# Patient Record
Sex: MALE | Race: WHITE | HISPANIC OR LATINO | Employment: FULL TIME | URBAN - METROPOLITAN AREA
[De-identification: names, ages, dates, MRNs, and addresses within clinical notes are randomized per-mention and may not be internally consistent; named-entity substitution may affect disease eponyms.]

---

## 2021-08-11 ENCOUNTER — OFFICE VISIT (OUTPATIENT)
Dept: URGENT CARE | Age: 26
End: 2021-08-11
Payer: COMMERCIAL

## 2021-08-11 VITALS
HEART RATE: 80 BPM | SYSTOLIC BLOOD PRESSURE: 122 MMHG | DIASTOLIC BLOOD PRESSURE: 74 MMHG | TEMPERATURE: 98 F | WEIGHT: 245.8 LBS | HEIGHT: 71 IN | BODY MASS INDEX: 34.41 KG/M2 | RESPIRATION RATE: 18 BRPM | OXYGEN SATURATION: 100 %

## 2021-08-11 DIAGNOSIS — Z02.4 DRIVER'S PERMIT PE (PHYSICAL EXAMINATION): Primary | ICD-10-CM

## 2021-08-11 NOTE — PROGRESS NOTES
[address]        NAME: Carol Giron is a 32 y o  male  : 1995    MRN: [id number]  DATE: 2021  TIME: 5:08 PM    Assessment and Plan   's permit PE (physical examination) [Z02 4]  1  's permit PE (physical examination)           Patient Instructions       Follow up with PCP in 3-5 days  Proceed to  ER if symptoms worsen  Chief Complaint     Chief Complaint   Patient presents with    Annual Exam     pt is here for drivers license exam          History of Present Illness       Annual Exam (pt is here for drivers license exam )    See scanned 's permit form      Review of Systems   Review of Systems   Constitutional: Negative  HENT: Negative  Eyes: Negative  Respiratory: Negative  Cardiovascular: Negative  Gastrointestinal: Negative  Musculoskeletal: Negative  Current Medications     No current outpatient medications on file  Current Allergies     Allergies as of 2021    (No Known Allergies)            The following portions of the patient's history were reviewed and updated as appropriate: allergies, current medications, past family history, past medical history, past social history, past surgical history and problem list      History reviewed  No pertinent past medical history  History reviewed  No pertinent surgical history  No family history on file  Medications have been verified  Objective   /74   Pulse 80   Temp 98 °F (36 7 °C)   Resp 18   Ht 5' 11" (1 803 m)   Wt 111 kg (245 lb 12 8 oz)   SpO2 100%   BMI 34 28 kg/m²   No LMP for male patient  Physical Exam     Physical Exam  Constitutional:       Appearance: He is well-developed  HENT:      Right Ear: External ear normal       Left Ear: External ear normal       Nose: Nose normal       Mouth/Throat:      Pharynx: No oropharyngeal exudate     Eyes:      Conjunctiva/sclera: Conjunctivae normal    Cardiovascular:      Rate and Rhythm: Normal rate and regular rhythm  Heart sounds: Normal heart sounds  No murmur heard  Pulmonary:      Effort: Pulmonary effort is normal  No respiratory distress  Breath sounds: Normal breath sounds  No wheezing or rales  Chest:      Chest wall: No tenderness  Abdominal:      General: There is no distension  Palpations: Abdomen is soft  There is no mass  Tenderness: There is no abdominal tenderness  There is no guarding or rebound  Musculoskeletal:         General: Normal range of motion  Cervical back: Normal range of motion and neck supple  Lymphadenopathy:      Cervical: No cervical adenopathy  Skin:     General: Skin is warm  Findings: No erythema or rash  Neurological:      Mental Status: He is alert and oriented to person, place, and time  Cranial Nerves: No cranial nerve deficit

## 2022-06-26 ENCOUNTER — HOSPITAL ENCOUNTER (EMERGENCY)
Facility: HOSPITAL | Age: 27
Discharge: HOME | End: 2022-06-26
Attending: EMERGENCY MEDICINE | Admitting: EMERGENCY MEDICINE

## 2022-06-26 VITALS
DIASTOLIC BLOOD PRESSURE: 68 MMHG | SYSTOLIC BLOOD PRESSURE: 153 MMHG | RESPIRATION RATE: 20 BRPM | HEART RATE: 77 BPM | OXYGEN SATURATION: 100 % | TEMPERATURE: 98.5 F

## 2022-06-26 DIAGNOSIS — J01.00 ACUTE NON-RECURRENT MAXILLARY SINUSITIS: Primary | ICD-10-CM

## 2022-06-26 PROCEDURE — 99284 EMERGENCY DEPT VISIT MOD MDM: CPT | Performed by: PHYSICIAN ASSISTANT

## 2022-06-26 PROCEDURE — 99282 EMERGENCY DEPT VISIT SF MDM: CPT

## 2022-06-26 RX ORDER — AMOXICILLIN AND CLAVULANATE POTASSIUM 875; 125 MG/1; MG/1
1 TABLET, FILM COATED ORAL EVERY 12 HOURS SCHEDULED
Qty: 20 TABLET | Refills: 0 | Status: SHIPPED | OUTPATIENT
Start: 2022-06-26 | End: 2022-07-06

## 2022-06-26 RX ORDER — PREDNISONE 20 MG/1
TABLET ORAL
Qty: 20 TABLET | Refills: 0 | Status: SHIPPED | OUTPATIENT
Start: 2022-06-26

## 2022-06-26 NOTE — ED PROVIDER NOTES
History  Chief Complaint   Patient presents with    Nasal Congestion     Pt c/o nasal congestion for a week  States dark green/ yellow color  Now having pain in right ear  Sinus Problem  Pain details:     Location:  Maxillary    Quality:  Pressure    Severity:  Moderate    Duration:  2 weeks    Timing:  Constant  Progression:  Worsening  Chronicity:  New  Context: recent URI    Relieved by:  Nothing  Worsened by:  Nothing  Ineffective treatments:  Acetaminophen, inhaler use, saline sprays, warm compresses and oral decongestants  Associated symptoms: congestion, ear pain, headaches, mouth breathing, nausea, rhinorrhea and sneezing    Associated symptoms: no chest pain, no chills, no cough, no fatigue, no fever, no hoarse voice, no shortness of breath, no snoring, no sore throat, no swollen glands, no tooth pain, no vertigo, no vomiting and no wheezing    Risk factors: smoke exposure    Risk factors: no allergic reaction, no asthma, no BiPAP, no COPD, no CPAP use, no cystic fibrosis, no diabetes, no immune deficiency, no nasal cannula and no nasal polyps        None       History reviewed  No pertinent past medical history  History reviewed  No pertinent surgical history  History reviewed  No pertinent family history  I have reviewed and agree with the history as documented  E-Cigarette/Vaping     E-Cigarette/Vaping Substances     Social History     Tobacco Use    Smoking status: Current Every Day Smoker     Packs/day: 1 00     Types: Cigarettes    Smokeless tobacco: Never Used   Substance Use Topics    Alcohol use: Never    Drug use: Not Currently     Types: Heroin       Review of Systems   Constitutional: Negative for chills, fatigue and fever  HENT: Positive for congestion, ear pain, postnasal drip, rhinorrhea, sinus pressure, sinus pain and sneezing   Negative for dental problem, drooling, ear discharge, facial swelling, hearing loss, hoarse voice, mouth sores, nosebleeds, sore throat, tinnitus, trouble swallowing and voice change  Eyes: Negative for pain and visual disturbance  Respiratory: Negative for snoring, cough, shortness of breath and wheezing  Cardiovascular: Negative for chest pain and palpitations  Gastrointestinal: Positive for nausea  Negative for abdominal pain and vomiting  Genitourinary: Negative for dysuria and hematuria  Musculoskeletal: Negative for arthralgias and back pain  Skin: Negative for color change and rash  Neurological: Positive for headaches  Negative for vertigo, seizures and syncope  Psychiatric/Behavioral: Positive for sleep disturbance  Negative for agitation, behavioral problems and confusion  All other systems reviewed and are negative  Physical Exam  Physical Exam  Vitals and nursing note reviewed  Constitutional:       General: He is not in acute distress  Appearance: Normal appearance  He is not ill-appearing, toxic-appearing or diaphoretic  HENT:      Head: Normocephalic and atraumatic  Nose: Nasal tenderness, mucosal edema, congestion and rhinorrhea present  Right Sinus: Maxillary sinus tenderness present  Left Sinus: Maxillary sinus tenderness present  Mouth/Throat:      Mouth: Mucous membranes are moist    Eyes:      General: No scleral icterus  Pupils: Pupils are equal, round, and reactive to light  Cardiovascular:      Rate and Rhythm: Normal rate and regular rhythm  Pulses: Normal pulses  Heart sounds: Normal heart sounds  Pulmonary:      Effort: Pulmonary effort is normal       Breath sounds: Normal breath sounds  Abdominal:      General: Abdomen is flat  There is no distension  Palpations: Abdomen is soft  Tenderness: There is no abdominal tenderness  There is no guarding or rebound  Hernia: No hernia is present  Musculoskeletal:         General: No swelling or tenderness  Normal range of motion  Cervical back: Normal range of motion     Skin: General: Skin is warm  Capillary Refill: Capillary refill takes less than 2 seconds  Neurological:      General: No focal deficit present  Mental Status: He is alert and oriented to person, place, and time  Psychiatric:         Mood and Affect: Mood normal          Behavior: Behavior normal          Vital Signs  ED Triage Vitals [06/26/22 0814]   Temperature Pulse Respirations Blood Pressure SpO2   98 5 °F (36 9 °C) 77 20 153/68 100 %      Temp Source Heart Rate Source Patient Position - Orthostatic VS BP Location FiO2 (%)   Oral Monitor Sitting Right arm --      Pain Score       --           Vitals:    06/26/22 0814   BP: 153/68   Pulse: 77   Patient Position - Orthostatic VS: Sitting         Visual Acuity      ED Medications  Medications - No data to display    Diagnostic Studies  Results Reviewed     None                 No orders to display              Procedures  Procedures         ED Course                                             MDM  Number of Diagnoses or Management Options  Acute non-recurrent maxillary sinusitis: new and does not require workup      Disposition  Final diagnoses:   Acute non-recurrent maxillary sinusitis     Time reflects when diagnosis was documented in both MDM as applicable and the Disposition within this note     Time User Action Codes Description Comment    6/26/2022  8:26 AM La Christina Her Add [J01 00] Acute non-recurrent maxillary sinusitis       ED Disposition     ED Disposition   Discharge    Condition   Stable    Date/Time   Sun Jun 26, 2022  8:26 AM    Comment   Roz Marroquin discharge to home/self care                 Follow-up Information    None         Patient's Medications   Discharge Prescriptions    AMOXICILLIN-CLAVULANATE (AUGMENTIN) 875-125 MG PER TABLET    Take 1 tablet by mouth every 12 (twelve) hours for 10 days       Start Date: 6/26/2022 End Date: 7/6/2022       Order Dose: 1 tablet       Quantity: 20 tablet    Refills: 0    PREDNISONE 20 MG TABLET    3 tabs daily x 3 days, then 2 tabs daily x 3 days, then 1 tab daily x 3 days, then 1/2 tab daily x 3 days and stop  Start Date: 6/26/2022 End Date: --       Order Dose: --       Quantity: 20 tablet    Refills: 0       No discharge procedures on file      PDMP Review     None          ED Provider  Electronically Signed by           Brady Luong PA-C  06/26/22 8226

## 2022-06-26 NOTE — Clinical Note
Tanvi Martha was seen and treated in our emergency department on 6/26/2022  Diagnosis:     Myriamjennifer Amira    He may return on this date: 06/27/2022         If you have any questions or concerns, please don't hesitate to call        Bhavna Hurley PA-C    ______________________________           _______________          _______________  Hospital Representative                              Date                                Time